# Patient Record
(demographics unavailable — no encounter records)

---

## 2024-11-05 NOTE — PLAN
[FreeTextEntry1] : Episode c/w vasovagal syncope (presyncopal symptoms followed by transient LOC occurring from upright posture, in setting of dehydration and orthostatic dizziness, with recollection of events afterwards) Discussed importance of hydration (48 oz non-caffeinated fluids/day) and adequate electrolyte intake, and not skipping meals If she feels presyncopal symptoms, she should lie down and elevate her legs, drinks fluids, eat a salty snack Defer EEG unless episodes recur Return as needed

## 2024-11-05 NOTE — HISTORY OF PRESENT ILLNESS
[FreeTextEntry1] : CASPER HAYNES is a 15 year old girl who presents for initial evaluation for syncope.    About 3 weeks ago, she had a fainting episode at home.  She woke up and felt dizzy.  She told her mom she did not feel well, like she would pass out.  She did not yet eat breakfast.  As she was walking to go sit down, she then lost consciousness.  She fell and hit her head.  Eyes were reportedly open, no shaking or stiffening movements.  After <1 minute, she returned to baseline.  She felt dizzy and had some blurred vision briefly afterwards.  Taken to [facility], where she had labs and EKG.  Labs c/w dehydration by report; normal CBC.  She had a couple episodes of emesis and was given IVF.  She was c/o sore throat and had strep test done, which was negative.  Daily fluid intake prior to this episode was low.  She also would have dizziness related to positional changes or sudden head movements. Over the last few weeks she has been drinking more fluids and these symptoms have resolved. Denies headache. Normal menses, no heavy bleeding. No skipped meals

## 2024-11-05 NOTE — CONSULT LETTER
[Dear  ___] : Dear  [unfilled], [Courtesy Letter:] : I had the pleasure of seeing your patient, [unfilled], in my office today. [Please see my note below.] : Please see my note below. [Sincerely,] : Sincerely, [FreeTextEntry3] : Rina Bedoya MD Child Neurologist [address], [address] [place] [address] Phone: [phone]
